# Patient Record
Sex: FEMALE | Race: WHITE | NOT HISPANIC OR LATINO | URBAN - METROPOLITAN AREA
[De-identification: names, ages, dates, MRNs, and addresses within clinical notes are randomized per-mention and may not be internally consistent; named-entity substitution may affect disease eponyms.]

---

## 2018-06-13 ENCOUNTER — APPOINTMENT (RX ONLY)
Dept: URBAN - METROPOLITAN AREA CLINIC 43 | Facility: CLINIC | Age: 68
Setting detail: DERMATOLOGY
End: 2018-06-13

## 2018-06-13 DIAGNOSIS — Z87.2 PERSONAL HISTORY OF DISEASES OF THE SKIN AND SUBCUTANEOUS TISSUE: ICD-10-CM

## 2018-06-13 DIAGNOSIS — L98.8 OTHER SPECIFIED DISORDERS OF THE SKIN AND SUBCUTANEOUS TISSUE: ICD-10-CM

## 2018-06-13 DIAGNOSIS — L82.1 OTHER SEBORRHEIC KERATOSIS: ICD-10-CM

## 2018-06-13 PROBLEM — F32.9 MAJOR DEPRESSIVE DISORDER, SINGLE EPISODE, UNSPECIFIED: Status: ACTIVE | Noted: 2018-06-13

## 2018-06-13 PROBLEM — F41.9 ANXIETY DISORDER, UNSPECIFIED: Status: ACTIVE | Noted: 2018-06-13

## 2018-06-13 PROCEDURE — ? TREATMENT REGIMEN

## 2018-06-13 PROCEDURE — ? COUNSELING

## 2018-06-13 PROCEDURE — 99203 OFFICE O/P NEW LOW 30 MIN: CPT

## 2018-06-13 ASSESSMENT — LOCATION SIMPLE DESCRIPTION DERM
LOCATION SIMPLE: POSTERIOR SCALP
LOCATION SIMPLE: LEFT UPPER ARM
LOCATION SIMPLE: NOSE

## 2018-06-13 ASSESSMENT — LOCATION DETAILED DESCRIPTION DERM
LOCATION DETAILED: NASAL DORSUM
LOCATION DETAILED: LEFT ANTERIOR DISTAL UPPER ARM
LOCATION DETAILED: MID-OCCIPITAL SCALP

## 2018-06-13 ASSESSMENT — LOCATION ZONE DERM
LOCATION ZONE: ARM
LOCATION ZONE: SCALP
LOCATION ZONE: NOSE

## 2018-06-13 NOTE — PROCEDURE: TREATMENT REGIMEN
Detail Level: Zone
Plan: Small benign cystic milium on posterior scalp\\nExpressed with #11 blade and extractor and epilated in expected fashion

## 2018-12-31 ENCOUNTER — APPOINTMENT (RX ONLY)
Dept: URBAN - METROPOLITAN AREA CLINIC 43 | Facility: CLINIC | Age: 68
Setting detail: DERMATOLOGY
End: 2018-12-31

## 2018-12-31 DIAGNOSIS — L82.0 INFLAMED SEBORRHEIC KERATOSIS: ICD-10-CM

## 2018-12-31 DIAGNOSIS — L94.2 CALCINOSIS CUTIS: ICD-10-CM

## 2018-12-31 DIAGNOSIS — Z12.83 ENCOUNTER FOR SCREENING FOR MALIGNANT NEOPLASM OF SKIN: ICD-10-CM

## 2018-12-31 PROBLEM — D48.5 NEOPLASM OF UNCERTAIN BEHAVIOR OF SKIN: Status: ACTIVE | Noted: 2018-12-31

## 2018-12-31 PROCEDURE — ? BIOPSY BY PUNCH METHOD

## 2018-12-31 PROCEDURE — ? COUNSELING

## 2018-12-31 PROCEDURE — 17110 DESTRUCTION B9 LES UP TO 14: CPT

## 2018-12-31 PROCEDURE — 11100: CPT | Mod: 59

## 2018-12-31 PROCEDURE — ? BENIGN DESTRUCTION

## 2018-12-31 PROCEDURE — 99213 OFFICE O/P EST LOW 20 MIN: CPT | Mod: 25

## 2018-12-31 ASSESSMENT — LOCATION DETAILED DESCRIPTION DERM
LOCATION DETAILED: LEFT VENTRAL PROXIMAL FOREARM
LOCATION DETAILED: LEFT DISTAL POSTERIOR UPPER ARM
LOCATION DETAILED: LEFT CENTRAL MANDIBULAR CHEEK
LOCATION DETAILED: LEFT ANTERIOR DISTAL UPPER ARM

## 2018-12-31 ASSESSMENT — LOCATION ZONE DERM
LOCATION ZONE: FACE
LOCATION ZONE: ARM

## 2018-12-31 ASSESSMENT — LOCATION SIMPLE DESCRIPTION DERM
LOCATION SIMPLE: LEFT POSTERIOR UPPER ARM
LOCATION SIMPLE: LEFT CHEEK
LOCATION SIMPLE: LEFT UPPER ARM
LOCATION SIMPLE: LEFT FOREARM

## 2018-12-31 NOTE — PROCEDURE: BIOPSY BY PUNCH METHOD
Suture Removal: 14 days
Billing Type: Third-Party Bill
Anesthesia Volume In Cc: 1
Size Of Lesion In Cm (Optional): 0
Accession #: [id number]
Detail Level: Detailed
Render Post-Care Instructions In Note?: yes
Bill 41457 For Specimen Handling/Conveyance To Laboratory?: no
Notification Instructions: Patient will be notified of biopsy results. However, patient instructed to call the office if not contacted within 2 weeks.
Lab Facility: [id number]
Biopsy Type: H and E
Consent: Oral consent was obtained and risks were reviewed including but not limited to scarring, infection, bleeding, scabbing, incomplete removal, nerve damage and allergy to anesthesia.
Anesthesia Type: 2% lidocaine with epinephrine and a 1:10 solution of 8.4% sodium bicarbonate
Dressing: bandage
Lab: [id number]
Home Suture Removal Text: Patient was provided a home suture removal kit and will remove their sutures at home.  If they have any questions or difficulties they will call the office.
Hemostasis: Pressure
Post-Care Instructions: I reviewed with the patient in detail post-care instructions. Patient is to keep the biopsy site covered and  dry overnight, and then wash gently with soap and water and apply vaseline twice daily until healed.
Epidermal Sutures: 5-0 Fast Absorbing Gut
Wound Care: Petrolatum
Punch Size In Mm: 4

## 2018-12-31 NOTE — PROCEDURE: BENIGN DESTRUCTION
Treatment Number (Will Not Render If 0): 0
Detail Level: Detailed
Add 52 Modifier (Optional): no
Consent: The patient's consent was obtained including but not limited to risks of crusting, scabbing, blistering, scarring, darker or lighter pigmentary change, recurrence, incomplete removal and infection.
Anesthesia Volume In Cc: 0.5
Medical Necessity Information: It is in your best interest to select a reason for this procedure from the list below. All of these items fulfill various CMS LCD requirements except the new and changing color options.
Post-Care Instructions: I reviewed with the patient in detail post-care instructions. Patient is to wear sunprotection, and avoid picking at any of the treated lesions. Pt may apply Vaseline to crusted or scabbing areas.
Medical Necessity Clause: This procedure was medically necessary because the lesion that was treated was

## 2019-01-17 ENCOUNTER — APPOINTMENT (RX ONLY)
Dept: URBAN - METROPOLITAN AREA CLINIC 43 | Facility: CLINIC | Age: 69
Setting detail: DERMATOLOGY
End: 2019-01-17

## 2019-01-17 DIAGNOSIS — Z41.9 ENCOUNTER FOR PROCEDURE FOR PURPOSES OTHER THAN REMEDYING HEALTH STATE, UNSPECIFIED: ICD-10-CM

## 2019-01-17 PROCEDURE — ? BOTOX

## 2019-01-17 PROCEDURE — ? JUVEDERM ULTRA XC INJECTION

## 2019-01-17 ASSESSMENT — LOCATION SIMPLE DESCRIPTION DERM
LOCATION SIMPLE: LEFT LIP
LOCATION SIMPLE: RIGHT LIP
LOCATION SIMPLE: RIGHT CHEEK
LOCATION SIMPLE: RIGHT UPPER LIP

## 2019-01-17 ASSESSMENT — LOCATION ZONE DERM
LOCATION ZONE: LIP
LOCATION ZONE: FACE

## 2019-01-17 ASSESSMENT — LOCATION DETAILED DESCRIPTION DERM
LOCATION DETAILED: RIGHT MEDIAL BUCCAL CHEEK
LOCATION DETAILED: RIGHT UPPER CUTANEOUS LIP
LOCATION DETAILED: RIGHT SUPERIOR VERMILION BORDER
LOCATION DETAILED: RIGHT INFERIOR VERMILION LIP
LOCATION DETAILED: LEFT ORAL COMMISSURE
LOCATION DETAILED: LEFT INFERIOR VERMILION LIP
LOCATION DETAILED: LEFT UPPER CUTANEOUS LIP

## 2019-01-17 NOTE — PROCEDURE: JUVEDERM ULTRA XC INJECTION
Temple Hollows Filler Volume In Cc: 0
Vermilion Lips Filler Volume In Cc: 0.3
Use Map Statement For Sites (Optional): No
Procedural Text: The filler was administered to the treatment areas noted above.
Additional Anesthesia Volume In Cc: 6
Lot #: [id number]
Additional Area 4 Location: Right earlobe
Marionette Lines Filler Volume In Cc: 0.7
Anesthesia Volume In Cc: 0.5
Consent: Written consent obtained. Risks include but not limited to bruising, beading, irregular texture, ulceration, infection, allergic reaction, scar formation, incomplete augmentation, temporary nature, procedural pain.
Price (Use Numbers Only, No Special Characters Or $): 301
Topical Anesthesia?: BLT cream (benzocaine 20%, lidocaine 6%, tetracaine 4%)
Additional Area 2 Location: medial lower cheeks
Filler: Juvederm Ultra XC
Additional Area 3 Location: atrophic scars
Map Statment: See Attach Map for Complete Details
Additional Area 1 Location: cutaneous upper and lower lip rhytid
Expiration Date (Month Year): 2019-08-03
Post-Care Instructions: Patient instructed to apply ice to reduce swelling.
Additional Area 5 Location: Upper cutaneous lip “barcode” lines
Detail Level: Detailed

## 2019-01-17 NOTE — PROCEDURE: BOTOX
Additional Area 6 Location: Left lateral infrabrow
Additional Area 6 Units: 0
Inferior Lateral Orbicularis Oculi Units: 4
Additional Area 5 Location: Right lateral infrabrow
Additional Area 3 Units: 2
Glabellar Complex Units: 16
Expiration Date (Month Year): 5/2021
Post-Care Instructions: Do not lie flat for 4 hours.  Avoid massage of face or use of a massage table face cradle for 24 hours.  No high intensity aerobic or weight lifting workouts for 24 hours.  No high altitude flight for 48 hours.
Additional Area 3 Location: orbicularis oris
Additional Area 2 Location: Chin
Additional Area 1 Location: Depressor anguli oris
Lot #: [id number]
Dilution (U/0.1 Cc): 1
Price (Use Numbers Only, No Special Characters Or $): 592
Detail Level: Detailed
Additional Area 4 Location: lateral suprabrow

## 2019-05-21 ENCOUNTER — APPOINTMENT (RX ONLY)
Dept: URBAN - METROPOLITAN AREA CLINIC 43 | Facility: CLINIC | Age: 69
Setting detail: DERMATOLOGY
End: 2019-05-21

## 2019-05-21 PROBLEM — D48.5 NEOPLASM OF UNCERTAIN BEHAVIOR OF SKIN: Status: ACTIVE | Noted: 2019-05-21

## 2019-05-21 PROCEDURE — 11102 TANGNTL BX SKIN SINGLE LES: CPT

## 2019-05-21 PROCEDURE — ? BIOPSY BY SHAVE METHOD

## 2019-05-21 NOTE — PROCEDURE: BIOPSY BY SHAVE METHOD
X Size Of Lesion In Cm: 0
Consent: Verbal consent was obtained and risks were reviewed including but not limited to scarring, infection, bleeding, scabbing, incomplete removal, nerve damage and allergy to anesthesia.
Bill For Surgical Tray: no
Electrodesiccation Text: The wound bed was treated with electrodesiccation after the biopsy was performed.
Biopsy Type: H and E
Anesthesia Volume In Cc: 0.5
Accession #: 
Type Of Destruction Used: Curettage
Detail Level: Simple
Was A Bandage Applied: Yes
Dressing: bandage
Electrodesiccation And Curettage Text: The wound bed was treated with electrodesiccation and curettage after the biopsy was performed.
Hemostasis: Drysol and Portable Heat Cautery
Biopsy Method: 15 blade
Billing Type: Third-Party Bill
Silver Nitrate Text: The wound bed was treated with silver nitrate after the biopsy was performed.
Anesthesia Type: 2% lidocaine with epinephrine and a 1:12 solution of 8.4% sodium bicarbonate
Post-Care Instructions: I reviewed with the patient in detail post-care instructions.  1. The biopsy site will be numb for about 2 hours, after that you may experience slight discomfort, typically not requiring pain medication. 2.  Leave the bandage we applied on overnight and keep the wound dry.  After removing the bandage the next morning, you may shower or bathe. 3.  Apply ointment and a bandage daily to the wound until it is healed. (typically one to two weeks). Avoid crusting or scabs to the biopsy site as this slows healing. 4.If bleeding occurs, apply firm pressure to the wound for 15 minutes.  Then apply a clean bandage. 5. If you have persistent bleeding, swelling, pain,or drainage from the wound, phone the office. If your wound was sutured return to the office for suture removal at the appointed time.
Lab: [id number]
Wound Care: Vaseline
Depth Of Biopsy: dermis
Notification Instructions: Patient will be notified of biopsy results. However, patient instructed to call the office if not contacted within 2 weeks.
Cryotherapy Text: The wound bed was treated with cryotherapy after the biopsy was performed.

## 2019-08-23 ENCOUNTER — APPOINTMENT (RX ONLY)
Dept: URBAN - METROPOLITAN AREA CLINIC 43 | Facility: CLINIC | Age: 69
Setting detail: DERMATOLOGY
End: 2019-08-23

## 2019-08-23 DIAGNOSIS — L98.8 OTHER SPECIFIED DISORDERS OF THE SKIN AND SUBCUTANEOUS TISSUE: ICD-10-CM

## 2019-08-23 DIAGNOSIS — Z41.9 ENCOUNTER FOR PROCEDURE FOR PURPOSES OTHER THAN REMEDYING HEALTH STATE, UNSPECIFIED: ICD-10-CM

## 2019-08-23 PROCEDURE — ? JEUVEAU

## 2019-08-23 PROCEDURE — ? JUVEDERM VOLUMA XC INJECTION

## 2019-08-23 PROCEDURE — ? JUVEDERM ULTRA XC INJECTION

## 2019-08-23 ASSESSMENT — LOCATION DETAILED DESCRIPTION DERM
LOCATION DETAILED: LEFT MEDIAL EYEBROW
LOCATION DETAILED: LEFT INFERIOR VERMILION LIP
LOCATION DETAILED: LEFT SUPERIOR VERMILION LIP
LOCATION DETAILED: RIGHT CENTRAL EYEBROW
LOCATION DETAILED: RIGHT SUPERIOR LATERAL MALAR CHEEK
LOCATION DETAILED: RIGHT CENTRAL MALAR CHEEK
LOCATION DETAILED: RIGHT SUPERIOR VERMILION LIP
LOCATION DETAILED: LEFT SUPERIOR LATERAL MALAR CHEEK
LOCATION DETAILED: GLABELLA
LOCATION DETAILED: RIGHT SUPERIOR CENTRAL MALAR CHEEK
LOCATION DETAILED: LEFT CENTRAL EYEBROW
LOCATION DETAILED: RIGHT LOWER CUTANEOUS LIP
LOCATION DETAILED: LEFT LOWER CUTANEOUS LIP
LOCATION DETAILED: LEFT CENTRAL MALAR CHEEK
LOCATION DETAILED: RIGHT MEDIAL EYEBROW
LOCATION DETAILED: LEFT LATERAL EYEBROW
LOCATION DETAILED: LEFT UPPER CUTANEOUS LIP
LOCATION DETAILED: RIGHT INFERIOR VERMILION LIP
LOCATION DETAILED: RIGHT UPPER CUTANEOUS LIP
LOCATION DETAILED: RIGHT LATERAL EYEBROW

## 2019-08-23 ASSESSMENT — LOCATION ZONE DERM
LOCATION ZONE: FACE
LOCATION ZONE: LIP

## 2019-08-23 ASSESSMENT — LOCATION SIMPLE DESCRIPTION DERM
LOCATION SIMPLE: GLABELLA
LOCATION SIMPLE: RIGHT EYEBROW
LOCATION SIMPLE: RIGHT LIP
LOCATION SIMPLE: LEFT CHEEK
LOCATION SIMPLE: LEFT LIP
LOCATION SIMPLE: RIGHT CHEEK
LOCATION SIMPLE: LEFT EYEBROW

## 2019-08-23 NOTE — PROCEDURE: JUVEDERM ULTRA XC INJECTION
Additional Area 5 Volume In Cc: 0
Vermilion Lips Filler Volume In Cc: 0.5
Post-Care Instructions: Patient instructed to apply ice to reduce swelling.
Price (Use Numbers Only, No Special Characters Or $): 132
Map Statment: See Attach Map for Complete Details
Additional Anesthesia Volume In Cc: 6
Lot #: [id number]
Additional Area 2 Location: medial lower cheeks
Detail Level: Detailed
Additional Area 4 Location: Right earlobe
Use Map Statement For Sites (Optional): No
Expiration Date (Month Year): 2020-07-20
Procedural Text: The filler was administered to the treatment areas noted above.
Additional Area 5 Location: cutaneous lip “barcode” lines
Consent: Written consent obtained. Risks include but not limited to bruising, beading, irregular texture, ulceration, infection, allergic reaction, scar formation, incomplete augmentation, temporary nature, procedural pain.
Filler: Juvederm Ultra XC
Additional Area 1 Location: cutaneous upper and lower lip rhytid
Additional Area 3 Location: atrophic scars

## 2019-08-23 NOTE — PROCEDURE: JUVEDERM VOLUMA XC INJECTION
Anesthesia Volume In Cc: 0.5
Expiration Date (Month Year): 2020-04-21
Additional Area 2 Volume In Cc: 0
Post-Care Instructions: Patient instructed to apply ice to reduce swelling.
Use Map Statement For Sites (Optional): No
Filler: Juvederm Voluma XC
Additional Area 1 Location: Chin (left and right)
Map Statment: See Attach Map for Complete Details
Lot #: [id number]
Price (Use Numbers Only, No Special Characters Or $): 1600
Cheeks Filler Volume In Cc: 2
Additional Area 2 Location: Temporal hollow
Consent: Written consent obtained. Risks include but not limited to bruising, beading, irregular texture, ulceration, infection, allergic reaction, scar formation, incomplete augmentation, temporary nature, procedural pain.  Rare complications can include vascular occlusion leading to necrosis of the skin, temporary or permanent loss of vision (blindness).
Additional Anesthesia Volume In Cc: 6
Detail Level: Detailed
Procedural Text: The filler was administered to the treatment areas noted above.

## 2019-08-23 NOTE — PROCEDURE: JEUVEAU
[place] Units: 0
Glabellar Complex Units: 25
Lot #: [id number]
Dilution (U/0.1 Cc): 10
Detail Level: Detailed
Additional Area 1 Location: Lateral tail of brow
Additional Area 1 Units: 4
Price (Use Numbers Only, No Special Characters Or $): 997
Consent: Written consent obtained. Risks include but not limited to lid/brow ptosis, bruising, swelling, diplopia, temporary effect, incomplete chemical denervation.
Post-Care Instructions: Patient instructed to not lie down for 4 hours and limit physical activity for 24 hours.

## 2019-10-18 ENCOUNTER — APPOINTMENT (RX ONLY)
Dept: URBAN - METROPOLITAN AREA CLINIC 43 | Facility: CLINIC | Age: 69
Setting detail: DERMATOLOGY
End: 2019-10-18

## 2019-10-18 DIAGNOSIS — Z41.9 ENCOUNTER FOR PROCEDURE FOR PURPOSES OTHER THAN REMEDYING HEALTH STATE, UNSPECIFIED: ICD-10-CM

## 2019-10-18 PROCEDURE — ? BOTOX

## 2019-10-18 PROCEDURE — ? JUVEDERM ULTRA XC INJECTION

## 2019-10-18 PROCEDURE — ? TREATMENT REGIMEN

## 2019-10-18 ASSESSMENT — LOCATION DETAILED DESCRIPTION DERM
LOCATION DETAILED: RIGHT MEDIAL EYEBROW
LOCATION DETAILED: LEFT CENTRAL EYEBROW
LOCATION DETAILED: RIGHT INFERIOR VERMILION LIP
LOCATION DETAILED: RIGHT LATERAL EYEBROW
LOCATION DETAILED: LEFT MEDIAL BUCCAL CHEEK
LOCATION DETAILED: RIGHT SUPERIOR VERMILION LIP
LOCATION DETAILED: LEFT MEDIAL EYEBROW
LOCATION DETAILED: LEFT INFERIOR VERMILION LIP
LOCATION DETAILED: RIGHT SUPERIOR MEDIAL BUCCAL CHEEK
LOCATION DETAILED: RIGHT CENTRAL EYEBROW
LOCATION DETAILED: LEFT LATERAL EYEBROW
LOCATION DETAILED: LEFT SUPERIOR VERMILION LIP
LOCATION DETAILED: GLABELLA

## 2019-10-18 ASSESSMENT — LOCATION SIMPLE DESCRIPTION DERM
LOCATION SIMPLE: RIGHT LIP
LOCATION SIMPLE: LEFT EYEBROW
LOCATION SIMPLE: RIGHT EYEBROW
LOCATION SIMPLE: RIGHT CHEEK
LOCATION SIMPLE: GLABELLA
LOCATION SIMPLE: LEFT LIP
LOCATION SIMPLE: LEFT CHEEK

## 2019-10-18 ASSESSMENT — LOCATION ZONE DERM
LOCATION ZONE: LIP
LOCATION ZONE: FACE

## 2019-10-18 NOTE — PROCEDURE: BOTOX
L Brow Units: 0
Show Periorbital Units: Yes
Detail Level: Detailed
Additional Area 3 Location: orbicularis oris
Additional Area 6 Location: [place]
Post-Care Instructions: Do not lie flat for 4 hours.  Avoid massage of face or use of a massage table face cradle for 24 hours.  No high intensity aerobic or weight lifting workouts for 24 hours.  No high altitude flight for 48 hours.
Additional Area 2 Location: Chin
Show Right And Left Brow Units: No
Periorbital Skin Units: 8
Dilution (U/0.1 Cc): 1
Price (Use Numbers Only, No Special Characters Or $): 822
Additional Area 5 Location: Lateral tail of brow
Glabellar Complex Units: 25
Additional Area 4 Location: Right and left inferior palpebral crease
Additional Area 1 Location: “Shaina lift” distribution
Lot #: [id number]

## 2019-10-18 NOTE — PROCEDURE: TREATMENT REGIMEN
Detail Level: Zone
Plan: Returning after visit in 8/2019 \\nBotox - overall pleased with smoothing of glabella but would like to try to get more lateral eyelid lift; will try 2 additional units of Botox per side into superior/lateral portion of orbicularis oculi but discussed that further improvement in droop to lid will likely require eyelid surgery (blepharoplasty)\\n\\nFillers - good improvement in degree of zygomatic flattening with Voluma.  She would like additional filler into lip as there is still minimal visible vermilion lip.

## 2019-10-18 NOTE — PROCEDURE: JUVEDERM ULTRA XC INJECTION
Nasolabial Folds Filler Volume In Cc: 0
Additional Area 3 Location: atrophic scars
Filler: Juvederm Ultra XC
Additional Area 1 Location: cutaneous upper and lower lip rhytid
Map Statment: See Attach Map for Complete Details
Expiration Date (Month Year): 2020-08-04
Additional Area 5 Location: cutaneous lip “barcode” lines
Use Map Statement For Sites (Optional): No
Detail Level: Detailed
Vermilion Lips Filler Volume In Cc: 0.6
Additional Area 4 Location: Right earlobe
Lot #: [id number]
Additional Area 2 Location: medial lower cheeks
Procedural Text: The filler was administered to the treatment areas noted above.
Additional Anesthesia Volume In Cc: 6
Marionette Lines Filler Volume In Cc: 0.4
Anesthesia Volume In Cc: 0.5
Price (Use Numbers Only, No Special Characters Or $): 045
Post-Care Instructions: Patient instructed to apply ice to reduce swelling.
Consent: Written consent obtained. Risks include but not limited to bruising, beading, irregular texture, ulceration, infection, allergic reaction, scar formation, incomplete augmentation, temporary nature, procedural pain.

## 2019-12-31 NOTE — HPI: EVALUATION OF SKIN LESION(S)
ICD 10 codes:   F33.1  R62.0    No, I did not mean to send ODT, I will change to tablet.  Thanks!  Rosalinda   
Have Your Spot(S) Been Treated In The Past?: has not been treated
Hpi Title: Evaluation of Skin Lesions

## 2020-03-06 ENCOUNTER — APPOINTMENT (RX ONLY)
Dept: URBAN - METROPOLITAN AREA CLINIC 43 | Facility: CLINIC | Age: 70
Setting detail: DERMATOLOGY
End: 2020-03-06

## 2020-03-06 DIAGNOSIS — Z41.9 ENCOUNTER FOR PROCEDURE FOR PURPOSES OTHER THAN REMEDYING HEALTH STATE, UNSPECIFIED: ICD-10-CM

## 2020-03-06 PROCEDURE — ? BOTOX

## 2020-03-06 PROCEDURE — ? JUVEDERM ULTRA XC INJECTION

## 2020-03-06 ASSESSMENT — LOCATION SIMPLE DESCRIPTION DERM
LOCATION SIMPLE: RIGHT LIP
LOCATION SIMPLE: GLABELLA
LOCATION SIMPLE: RIGHT CHEEK
LOCATION SIMPLE: LEFT LIP
LOCATION SIMPLE: LEFT CHEEK
LOCATION SIMPLE: LEFT EYEBROW
LOCATION SIMPLE: RIGHT EYEBROW

## 2020-03-06 ASSESSMENT — LOCATION DETAILED DESCRIPTION DERM
LOCATION DETAILED: LEFT UPPER CUTANEOUS LIP
LOCATION DETAILED: LEFT SUPERIOR VERMILION LIP
LOCATION DETAILED: RIGHT MEDIAL EYEBROW
LOCATION DETAILED: GLABELLA
LOCATION DETAILED: RIGHT SUPERIOR VERMILION LIP
LOCATION DETAILED: RIGHT CENTRAL EYEBROW
LOCATION DETAILED: RIGHT UPPER CUTANEOUS LIP
LOCATION DETAILED: RIGHT SUPERIOR MEDIAL BUCCAL CHEEK
LOCATION DETAILED: LEFT MEDIAL BUCCAL CHEEK
LOCATION DETAILED: LEFT LATERAL EYEBROW
LOCATION DETAILED: LEFT INFERIOR VERMILION LIP
LOCATION DETAILED: LEFT CENTRAL EYEBROW
LOCATION DETAILED: RIGHT LATERAL EYEBROW
LOCATION DETAILED: LEFT MEDIAL EYEBROW
LOCATION DETAILED: RIGHT INFERIOR VERMILION LIP

## 2020-03-06 ASSESSMENT — LOCATION ZONE DERM
LOCATION ZONE: FACE
LOCATION ZONE: LIP

## 2020-03-06 NOTE — PROCEDURE: JUVEDERM ULTRA XC INJECTION
Mid Face Filler Volume In Cc: 0
Additional Area 3 Location: atrophic scars
Consent: Written consent obtained. Risks include but not limited to bruising, beading, irregular texture, ulceration, infection, allergic reaction, scar formation, incomplete augmentation, temporary nature, procedural pain.
Additional Area 2 Location: medial lower cheeks
Procedural Text: The filler was administered to the treatment areas noted above.
Price (Use Numbers Only, No Special Characters Or $): 728
Additional Area 4 Location: Right earlobe
Map Statment: See Attach Map for Complete Details
Filler: Juvederm Ultra XC
Post-Care Instructions: Patient instructed to apply ice to reduce swelling.
Lot #: [id number]
Detail Level: Detailed
Topical Anesthesia?: BLT cream (benzocaine 20%, lidocaine 6%, tetracaine 4%)
Use Map Statement For Sites (Optional): No
Anesthesia Volume In Cc: 0.5
Additional Area 5 Location: cutaneous lip “barcode” lines
Additional Anesthesia Volume In Cc: 6
Expiration Date (Month Year): 2020-10-03
Additional Area 1 Location: cutaneous upper and lower lip rhytid
Vermilion Lips Filler Volume In Cc: 1

## 2020-03-06 NOTE — PROCEDURE: BOTOX
Mentalis Units: 0
Show Additional Area 2: Yes
Periorbital Skin Units: 8
Additional Area 5 Location: Lateral tail of brow
Price (Use Numbers Only, No Special Characters Or $): 490
Glabellar Complex Units: 25
Dilution (U/0.1 Cc): 1
Additional Area 3 Units: 2
Show Ucl Units: No
Additional Area 3 Location: orbicularis oris
Additional Area 4 Location: Right and left inferior palpebral crease
Post-Care Instructions: Do not lie flat for 4 hours.  Avoid massage of face or use of a massage table face cradle for 24 hours.  No high intensity aerobic or weight lifting workouts for 24 hours.  No high altitude flight for 48 hours.
Additional Area 6 Location: [place]
Lot #: [id number]
Detail Level: Detailed
Additional Area 2 Location: Chin
Additional Area 1 Location: “Shaina lift” distribution